# Patient Record
Sex: MALE | Race: AMERICAN INDIAN OR ALASKA NATIVE | NOT HISPANIC OR LATINO
[De-identification: names, ages, dates, MRNs, and addresses within clinical notes are randomized per-mention and may not be internally consistent; named-entity substitution may affect disease eponyms.]

---

## 2017-06-23 ENCOUNTER — TRANSCRIPTION ENCOUNTER (OUTPATIENT)
Age: 42
End: 2017-06-23

## 2017-07-08 ENCOUNTER — TRANSCRIPTION ENCOUNTER (OUTPATIENT)
Age: 42
End: 2017-07-08

## 2017-07-21 ENCOUNTER — OUTPATIENT (OUTPATIENT)
Dept: OUTPATIENT SERVICES | Facility: HOSPITAL | Age: 42
LOS: 1 days | Discharge: HOME | End: 2017-07-21

## 2017-07-21 DIAGNOSIS — Z01.818 ENCOUNTER FOR OTHER PREPROCEDURAL EXAMINATION: ICD-10-CM

## 2017-07-21 DIAGNOSIS — E01.8 OTHER IODINE-DEFICIENCY RELATED THYROID DISORDERS AND ALLIED CONDITIONS: ICD-10-CM

## 2017-07-21 DIAGNOSIS — S83.231D COMPLEX TEAR OF MEDIAL MENISCUS, CURRENT INJURY, RIGHT KNEE, SUBSEQUENT ENCOUNTER: ICD-10-CM

## 2017-08-04 ENCOUNTER — OUTPATIENT (OUTPATIENT)
Dept: OUTPATIENT SERVICES | Facility: HOSPITAL | Age: 42
LOS: 1 days | Discharge: HOME | End: 2017-08-04

## 2017-08-10 DIAGNOSIS — F17.210 NICOTINE DEPENDENCE, CIGARETTES, UNCOMPLICATED: ICD-10-CM

## 2017-08-10 DIAGNOSIS — M94.261 CHONDROMALACIA, RIGHT KNEE: ICD-10-CM

## 2017-08-10 DIAGNOSIS — M23.261 DERANGEMENT OF OTHER LATERAL MENISCUS DUE TO OLD TEAR OR INJURY, RIGHT KNEE: ICD-10-CM

## 2021-09-10 ENCOUNTER — TRANSCRIPTION ENCOUNTER (OUTPATIENT)
Age: 46
End: 2021-09-10

## 2022-06-12 ENCOUNTER — NON-APPOINTMENT (OUTPATIENT)
Age: 47
End: 2022-06-12

## 2022-06-13 ENCOUNTER — INPATIENT (INPATIENT)
Facility: HOSPITAL | Age: 47
LOS: 1 days | Discharge: HOME | End: 2022-06-15
Attending: INTERNAL MEDICINE | Admitting: INTERNAL MEDICINE
Payer: COMMERCIAL

## 2022-06-13 VITALS
HEART RATE: 100 BPM | SYSTOLIC BLOOD PRESSURE: 140 MMHG | TEMPERATURE: 101 F | OXYGEN SATURATION: 93 % | RESPIRATION RATE: 18 BRPM | DIASTOLIC BLOOD PRESSURE: 88 MMHG

## 2022-06-13 LAB
ALBUMIN SERPL ELPH-MCNC: 3.4 G/DL — LOW (ref 3.5–5.2)
ALP SERPL-CCNC: 137 U/L — HIGH (ref 30–115)
ALT FLD-CCNC: 98 U/L — HIGH (ref 0–41)
ANION GAP SERPL CALC-SCNC: 16 MMOL/L — HIGH (ref 7–14)
APPEARANCE UR: CLEAR — SIGNIFICANT CHANGE UP
AST SERPL-CCNC: 73 U/L — HIGH (ref 0–41)
BACTERIA # UR AUTO: NEGATIVE — SIGNIFICANT CHANGE UP
BASE EXCESS BLDV CALC-SCNC: 4.6 MMOL/L — HIGH (ref -2–3)
BASOPHILS # BLD AUTO: 0 K/UL — SIGNIFICANT CHANGE UP (ref 0–0.2)
BASOPHILS NFR BLD AUTO: 0 % — SIGNIFICANT CHANGE UP (ref 0–1)
BILIRUB SERPL-MCNC: 0.8 MG/DL — SIGNIFICANT CHANGE UP (ref 0.2–1.2)
BILIRUB UR-MCNC: NEGATIVE — SIGNIFICANT CHANGE UP
BUN SERPL-MCNC: 15 MG/DL — SIGNIFICANT CHANGE UP (ref 10–20)
CA-I SERPL-SCNC: 1.13 MMOL/L — LOW (ref 1.15–1.33)
CALCIUM SERPL-MCNC: 9 MG/DL — SIGNIFICANT CHANGE UP (ref 8.5–10.1)
CHLORIDE SERPL-SCNC: 96 MMOL/L — LOW (ref 98–110)
CO2 SERPL-SCNC: 21 MMOL/L — SIGNIFICANT CHANGE UP (ref 17–32)
COLOR SPEC: YELLOW — SIGNIFICANT CHANGE UP
CREAT SERPL-MCNC: 0.9 MG/DL — SIGNIFICANT CHANGE UP (ref 0.7–1.5)
DIFF PNL FLD: NEGATIVE — SIGNIFICANT CHANGE UP
EGFR: 106 ML/MIN/1.73M2 — SIGNIFICANT CHANGE UP
EOSINOPHIL # BLD AUTO: 0 K/UL — SIGNIFICANT CHANGE UP (ref 0–0.7)
EOSINOPHIL NFR BLD AUTO: 0 % — SIGNIFICANT CHANGE UP (ref 0–8)
EPI CELLS # UR: 1 /HPF — SIGNIFICANT CHANGE UP (ref 0–5)
FLUAV AG NPH QL: SIGNIFICANT CHANGE UP
FLUBV AG NPH QL: SIGNIFICANT CHANGE UP
GAS PNL BLDV: 130 MMOL/L — LOW (ref 136–145)
GAS PNL BLDV: SIGNIFICANT CHANGE UP
GAS PNL BLDV: SIGNIFICANT CHANGE UP
GIANT PLATELETS BLD QL SMEAR: PRESENT — SIGNIFICANT CHANGE UP
GLUCOSE SERPL-MCNC: 101 MG/DL — HIGH (ref 70–99)
GLUCOSE UR QL: NEGATIVE — SIGNIFICANT CHANGE UP
HCO3 BLDV-SCNC: 27 MMOL/L — SIGNIFICANT CHANGE UP (ref 22–29)
HCT VFR BLD CALC: 37.3 % — LOW (ref 42–52)
HCT VFR BLDA CALC: 58 % — CRITICAL HIGH (ref 39–51)
HGB BLD CALC-MCNC: 19.2 G/DL — CRITICAL HIGH (ref 12.6–17.4)
HGB BLD-MCNC: 13.4 G/DL — LOW (ref 14–18)
HIV 1 & 2 AB SERPL IA.RAPID: SIGNIFICANT CHANGE UP
HYALINE CASTS # UR AUTO: 0 /LPF — SIGNIFICANT CHANGE UP (ref 0–7)
KETONES UR-MCNC: NEGATIVE — SIGNIFICANT CHANGE UP
LACTATE BLDV-MCNC: 1.5 MMOL/L — SIGNIFICANT CHANGE UP (ref 0.5–2)
LEUKOCYTE ESTERASE UR-ACNC: NEGATIVE — SIGNIFICANT CHANGE UP
LYMPHOCYTES # BLD AUTO: 11.4 % — LOW (ref 20.5–51.1)
LYMPHOCYTES # BLD AUTO: 3.26 K/UL — SIGNIFICANT CHANGE UP (ref 1.2–3.4)
MANUAL SMEAR VERIFICATION: SIGNIFICANT CHANGE UP
MCHC RBC-ENTMCNC: 32.8 PG — HIGH (ref 27–31)
MCHC RBC-ENTMCNC: 35.9 G/DL — SIGNIFICANT CHANGE UP (ref 32–37)
MCV RBC AUTO: 91.2 FL — SIGNIFICANT CHANGE UP (ref 80–94)
METAMYELOCYTES # FLD: 1.8 % — HIGH (ref 0–0)
MONOCYTES # BLD AUTO: 2.52 K/UL — HIGH (ref 0.1–0.6)
MONOCYTES NFR BLD AUTO: 8.8 % — SIGNIFICANT CHANGE UP (ref 1.7–9.3)
MYELOCYTES NFR BLD: 1.7 % — HIGH (ref 0–0)
NEUTROPHILS # BLD AUTO: 21.55 K/UL — HIGH (ref 1.4–6.5)
NEUTROPHILS NFR BLD AUTO: 67.5 % — SIGNIFICANT CHANGE UP (ref 42.2–75.2)
NEUTS BAND # BLD: 7.9 % — HIGH (ref 0–6)
NITRITE UR-MCNC: NEGATIVE — SIGNIFICANT CHANGE UP
PCO2 BLDV: 33 MMHG — LOW (ref 42–55)
PH BLDV: 7.52 — HIGH (ref 7.32–7.43)
PH UR: 6.5 — SIGNIFICANT CHANGE UP (ref 5–8)
PLAT MORPH BLD: NORMAL — SIGNIFICANT CHANGE UP
PLATELET # BLD AUTO: 310 K/UL — SIGNIFICANT CHANGE UP (ref 130–400)
PO2 BLDV: 53 MMHG — SIGNIFICANT CHANGE UP
POTASSIUM BLDV-SCNC: 4.5 MMOL/L — SIGNIFICANT CHANGE UP (ref 3.5–5.1)
POTASSIUM SERPL-MCNC: 4.4 MMOL/L — SIGNIFICANT CHANGE UP (ref 3.5–5)
POTASSIUM SERPL-SCNC: 4.4 MMOL/L — SIGNIFICANT CHANGE UP (ref 3.5–5)
PROT SERPL-MCNC: 7.3 G/DL — SIGNIFICANT CHANGE UP (ref 6–8)
PROT UR-MCNC: ABNORMAL
RBC # BLD: 4.09 M/UL — LOW (ref 4.7–6.1)
RBC # FLD: 13.3 % — SIGNIFICANT CHANGE UP (ref 11.5–14.5)
RBC BLD AUTO: ABNORMAL
RBC CASTS # UR COMP ASSIST: 2 /HPF — SIGNIFICANT CHANGE UP (ref 0–4)
RSV RNA NPH QL NAA+NON-PROBE: SIGNIFICANT CHANGE UP
SAO2 % BLDV: 88.1 % — SIGNIFICANT CHANGE UP
SARS-COV-2 RNA SPEC QL NAA+PROBE: SIGNIFICANT CHANGE UP
SODIUM SERPL-SCNC: 133 MMOL/L — LOW (ref 135–146)
SP GR SPEC: 1.02 — SIGNIFICANT CHANGE UP (ref 1.01–1.03)
TOXIC GRANULES BLD QL SMEAR: PRESENT — SIGNIFICANT CHANGE UP
TSH SERPL-MCNC: 0.5 UIU/ML — SIGNIFICANT CHANGE UP (ref 0.27–4.2)
UROBILINOGEN FLD QL: ABNORMAL
VARIANT LYMPHS # BLD: 0.9 % — SIGNIFICANT CHANGE UP (ref 0–5)
WBC # BLD: 28.58 K/UL — HIGH (ref 4.8–10.8)
WBC # FLD AUTO: 28.58 K/UL — HIGH (ref 4.8–10.8)
WBC UR QL: 2 /HPF — SIGNIFICANT CHANGE UP (ref 0–5)

## 2022-06-13 PROCEDURE — 71046 X-RAY EXAM CHEST 2 VIEWS: CPT | Mod: 26

## 2022-06-13 PROCEDURE — 93010 ELECTROCARDIOGRAM REPORT: CPT

## 2022-06-13 PROCEDURE — 99285 EMERGENCY DEPT VISIT HI MDM: CPT

## 2022-06-13 PROCEDURE — 76705 ECHO EXAM OF ABDOMEN: CPT | Mod: 26

## 2022-06-13 PROCEDURE — 99223 1ST HOSP IP/OBS HIGH 75: CPT

## 2022-06-13 RX ORDER — SODIUM CHLORIDE 9 MG/ML
2400 INJECTION INTRAMUSCULAR; INTRAVENOUS; SUBCUTANEOUS ONCE
Refills: 0 | Status: COMPLETED | OUTPATIENT
Start: 2022-06-13 | End: 2022-06-13

## 2022-06-13 RX ORDER — CEFTRIAXONE 500 MG/1
1000 INJECTION, POWDER, FOR SOLUTION INTRAMUSCULAR; INTRAVENOUS EVERY 24 HOURS
Refills: 0 | Status: DISCONTINUED | OUTPATIENT
Start: 2022-06-13 | End: 2022-06-15

## 2022-06-13 RX ORDER — DESMOPRESSIN ACETATE 0.1 MG/1
2 TABLET ORAL THREE TIMES A DAY
Refills: 0 | Status: DISCONTINUED | OUTPATIENT
Start: 2022-06-13 | End: 2022-06-15

## 2022-06-13 RX ORDER — CEFTRIAXONE 500 MG/1
1000 INJECTION, POWDER, FOR SOLUTION INTRAMUSCULAR; INTRAVENOUS ONCE
Refills: 0 | Status: COMPLETED | OUTPATIENT
Start: 2022-06-13 | End: 2022-06-13

## 2022-06-13 RX ORDER — IPRATROPIUM/ALBUTEROL SULFATE 18-103MCG
3 AEROSOL WITH ADAPTER (GRAM) INHALATION
Refills: 0 | Status: COMPLETED | OUTPATIENT
Start: 2022-06-13 | End: 2022-06-13

## 2022-06-13 RX ORDER — AZITHROMYCIN 500 MG/1
500 TABLET, FILM COATED ORAL ONCE
Refills: 0 | Status: COMPLETED | OUTPATIENT
Start: 2022-06-13 | End: 2022-06-13

## 2022-06-13 RX ORDER — SODIUM CHLORIDE 9 MG/ML
1000 INJECTION INTRAMUSCULAR; INTRAVENOUS; SUBCUTANEOUS
Refills: 0 | Status: DISCONTINUED | OUTPATIENT
Start: 2022-06-13 | End: 2022-06-14

## 2022-06-13 RX ORDER — AZITHROMYCIN 500 MG/1
500 TABLET, FILM COATED ORAL EVERY 24 HOURS
Refills: 0 | Status: DISCONTINUED | OUTPATIENT
Start: 2022-06-14 | End: 2022-06-15

## 2022-06-13 RX ORDER — IPRATROPIUM/ALBUTEROL SULFATE 18-103MCG
3 AEROSOL WITH ADAPTER (GRAM) INHALATION ONCE
Refills: 0 | Status: COMPLETED | OUTPATIENT
Start: 2022-06-13 | End: 2022-06-13

## 2022-06-13 RX ORDER — HEPARIN SODIUM 5000 [USP'U]/ML
5000 INJECTION INTRAVENOUS; SUBCUTANEOUS EVERY 8 HOURS
Refills: 0 | Status: DISCONTINUED | OUTPATIENT
Start: 2022-06-13 | End: 2022-06-15

## 2022-06-13 RX ORDER — ACETAMINOPHEN 500 MG
650 TABLET ORAL ONCE
Refills: 0 | Status: COMPLETED | OUTPATIENT
Start: 2022-06-13 | End: 2022-06-13

## 2022-06-13 RX ORDER — LANOLIN ALCOHOL/MO/W.PET/CERES
10 CREAM (GRAM) TOPICAL ONCE
Refills: 0 | Status: COMPLETED | OUTPATIENT
Start: 2022-06-13 | End: 2022-06-13

## 2022-06-13 RX ORDER — LEVOTHYROXINE SODIUM 125 MCG
50 TABLET ORAL DAILY
Refills: 0 | Status: DISCONTINUED | OUTPATIENT
Start: 2022-06-13 | End: 2022-06-15

## 2022-06-13 RX ORDER — HYDROCORTISONE 20 MG
100 TABLET ORAL ONCE
Refills: 0 | Status: COMPLETED | OUTPATIENT
Start: 2022-06-13 | End: 2022-06-13

## 2022-06-13 RX ADMIN — CEFTRIAXONE 100 MILLIGRAM(S): 500 INJECTION, POWDER, FOR SOLUTION INTRAMUSCULAR; INTRAVENOUS at 11:11

## 2022-06-13 RX ADMIN — CEFTRIAXONE 1000 MILLIGRAM(S): 500 INJECTION, POWDER, FOR SOLUTION INTRAMUSCULAR; INTRAVENOUS at 11:48

## 2022-06-13 RX ADMIN — Medication 3 MILLILITER(S): at 11:20

## 2022-06-13 RX ADMIN — Medication 100 MILLIGRAM(S): at 12:42

## 2022-06-13 RX ADMIN — Medication 3 MILLILITER(S): at 12:42

## 2022-06-13 RX ADMIN — Medication 3 MILLILITER(S): at 21:28

## 2022-06-13 RX ADMIN — Medication 10 MILLIGRAM(S): at 21:48

## 2022-06-13 RX ADMIN — AZITHROMYCIN 255 MILLIGRAM(S): 500 TABLET, FILM COATED ORAL at 11:11

## 2022-06-13 RX ADMIN — SODIUM CHLORIDE 2400 MILLILITER(S): 9 INJECTION INTRAMUSCULAR; INTRAVENOUS; SUBCUTANEOUS at 11:19

## 2022-06-13 RX ADMIN — Medication 650 MILLIGRAM(S): at 11:48

## 2022-06-13 RX ADMIN — SODIUM CHLORIDE 60 MILLILITER(S): 9 INJECTION INTRAMUSCULAR; INTRAVENOUS; SUBCUTANEOUS at 17:49

## 2022-06-13 RX ADMIN — Medication 3 MILLILITER(S): at 11:15

## 2022-06-13 RX ADMIN — Medication 650 MILLIGRAM(S): at 11:11

## 2022-06-13 NOTE — H&P ADULT - HISTORY OF PRESENT ILLNESS
47-year-old male presents to the ED with multiple medical complaints. Pt complains of generalized fatigue, cough with productive sputum and difficulty breathing.  Patient states that he is concerned that he may be having an adrenal crisis given his fatigue.  Also reports fever for the past 3 days T-max 101, has not taken any medication prior to coming into the ER.  Patient also states that he has not taken his thyroid medication, Synthroid 50 mcg, in the past 6 months stating that he was trying to wean himself off of it.  Patient denies headache vision change neck pain chest pain abdominal pain back pain dizziness lightheadedness syncope weakness or numbness.  No recent sick contacts, vaccinated for COVID    Labs :WBC 28, Lactate 1.5, Alk phos 137, AST 73, ALT 98    CXR : Patchy left upper and lower lung zone airspace opacities, compatible with pneumonia in the appropriate clinical setting. Follow-up to resolution is recommended.    s/p NS, rocephin, azithromycin and hydrocortisone 100mg IVP in the ED 47-year-old male PMH Hypopituitarism (IGG4 related per wife) diagnosed 6 years ago, sec adrenal insufficiency (Multiple episodes of adrenal crisis), hypothyroidism,  presents to the ED with multiple medical complaints. Pt complains of generalized fatigue, cough with productive sputum and difficulty breathing.  Patient states that he is concerned that he may be having an adrenal crisis given his symptoms. Also reports fever for the past 3 days T-max 101, has not taken any medication prior to coming into the ER.    Endorses non -compliance with Synthroid 50 mcg, in the past 6 months, but resumed last thursday when he was not feeling well.  Patient denies headache, vision change, neck pain, chest pain, abdominal pain, back pain, dizziness, lightheadedness, syncope, weakness or numbness.  No recent sick contacts, vaccinated for COVID    Labs :WBC 28, Lactate 1.5, Alk phos 137, AST 73, ALT 98    CXR : Patchy left upper and lower lung zone airspace opacities, compatible with pneumonia in the appropriate clinical setting. Follow-up to resolution is recommended.    s/p NS, rocephin, azithromycin and hydrocortisone 100mg IVP in the ED

## 2022-06-13 NOTE — PATIENT PROFILE ADULT - FALL HARM RISK - HARM RISK INTERVENTIONS

## 2022-06-13 NOTE — ED PROVIDER NOTE - OBJECTIVE STATEMENT
47-year-old male presents emergency department complaining of generalized fatigue, cough with productive sputum and difficulty breathing.  Patient states that he is concerned that he may be having an adrenal crisis given his fatigue.  Also reports fever for the past 3 days T-max 101, has not taken any medication prior to coming into the ER.  Patient also states that he has not taken his thyroid medication, Synthroid 50 mcg, in the past 6 months stating that he was trying to wean himself off of it.  Patient denies headache vision change neck pain chest pain abdominal pain back pain dizziness lightheadedness syncope weakness or numbness.  No recent sick contacts vaccinated for COVID

## 2022-06-13 NOTE — H&P ADULT - NSHPPHYSICALEXAM_GEN_ALL_CORE
GENERAL: NAD, speaks in full sentences, no signs of respiratory distress  HEAD:  Atraumatic, Normocephalic  EYES: EOMI, PERRLA, conjunctiva and sclera clear  NECK: Supple, No JVD  CHEST/LUNG: Clear to auscultation bilaterally; No wheeze; No crackles; No accessory muscles used  HEART: Regular rate and rhythm; No murmurs;   ABDOMEN: Soft, Nontender, Nondistended; Bowel sounds present; No guarding  EXTREMITIES:  2+ Peripheral Pulses, No cyanosis or edema  PSYCH: AAOx3  NEUROLOGY: non-focal GENERAL: NAD, speaks in full sentences, no signs of respiratory distress  HEAD:  Atraumatic, Normocephalic  NECK: Supple, No JVD  CHEST/LUNG: crackles L >R, No accessory muscles used  HEART: Regular rate and rhythm; No murmurs;   ABDOMEN: Soft, Nontender, Nondistended; Bowel sounds present; No guarding  EXTREMITIES:  2+ Peripheral Pulses, No cyanosis or edema  PSYCH: AAOx3  NEUROLOGY: non-focal

## 2022-06-13 NOTE — H&P ADULT - NSICDXPASTMEDICALHX_GEN_ALL_CORE_FT
PAST MEDICAL HISTORY:  Hypothyroidism      PAST MEDICAL HISTORY:  Adrenal insufficiency     Hypopituitarism     Hypothyroidism

## 2022-06-13 NOTE — ED ADULT NURSE NOTE - NSIMPLEMENTINTERV_GEN_ALL_ED
Implemented All Universal Safety Interventions:  [place] to call system. Call bell, personal items and telephone within reach. Instruct patient to call for assistance. Room bathroom lighting operational. Non-slip footwear when patient is off stretcher. Physically safe environment: no spills, clutter or unnecessary equipment. Stretcher in lowest position, wheels locked, appropriate side rails in place.

## 2022-06-13 NOTE — ED ADULT NURSE NOTE - CHIEF COMPLAINT QUOTE
sent in from [facility] [place] [place] "fluid in lungs and adrenal crisis" pt hasn't been taking thyroid medications for over 6 months

## 2022-06-13 NOTE — ED PROVIDER NOTE - PHYSICAL EXAMINATION
CONST: Well appearing in NAD  EYES: PERRL, EOMI, Sclera and conjunctiva clear. Vision grossly intact  ENT: No nasal discharge. TM's clear B/L without drainage. Oropharynx normal appearing, no erythema or exudates. Uvula midline.  NECK: Non-tender, no meningeal signs  CARD: Normal S1 S2; Normal rate and rhythm  RESP: Rhonchi over right middle and lower lung fields, patient able to speak in full sentences no signs of respiratory distress  GI: Soft, non-tender, non-distended. No rebound or Guarding  MS: Normal ROM in all extremities. No midline spinal tenderness.  SKIN: Warm, dry, no acute rashes. Good turgor  NEURO: A&Ox3, No focal deficits. Strength 5/5 with no sensory deficits. Steady gait

## 2022-06-13 NOTE — ED PROVIDER NOTE - NS ED ROS FT
CONST: Fever with body aches and generalized fatigue  EYES: No pain, redness, drainage or visual changes.  ENT: No ear pain or discharge, nasal discharge or congestion. No sore throat  CARD: No chest pain, palpitations  RESP: Shortness of breath with productive cough, whitish sputum  GI: No abdominal pain, N/V/D  : No urinary symptoms  MS: No joint pain, back pain or extremity pain/injury  SKIN: No rashes  NEURO: No headache, dizziness, paresthesias or LOC

## 2022-06-13 NOTE — ED PROVIDER NOTE - ATTENDING APP SHARED VISIT CONTRIBUTION OF CARE
47-year-old male history of hypothyroidism but not compliant with his Synthroid, adrenal insufficiency, now presents with fever x3 days, with productive cough.  Seen in urgent care.  Referred to evaluation.  Vaccinations up-to-date.  No vomiting.  No abdominal pain.    vss, febrile, full sentences, nontoxic, well appearing, pink conj, no photophobia, anicteric, MMM, neck supple, bilateral rhonchi, wheezing, RRR, equal radial pulses bilat, abd soft/nt/nd, no cva tend. no calves tend, no edema, no fnd. no rashes.    Plan:  labs, imaging, meds, reassess

## 2022-06-13 NOTE — ED PROVIDER NOTE - NS ED ATTENDING STATEMENT MOD
This was a shared visit with the TING. I reviewed and verified the documentation and independently performed the documented:

## 2022-06-13 NOTE — H&P ADULT - ATTENDING COMMENTS
The patient is seen and examined the history labs and imaging are reviewed. I agree with the resident note unless otherwise noted.    #Community Acquired Pneumonia  # Diffuse wheezing in setting of chronic smoking history.  # Sepsis present on admission   CXR : Patchy left upper and lower lung zone airspace opacities, compatible with pneumonia in the appropriate clinical setting. Follow-up to resolution is recommended.  s/p NS, Rocephin, azithromycin and hydrocortisone 100mg IVP in the ED  f/u BCx    urine legionella   outpatient pulmonary eval   Dunebs q6 PRN     #Hx of hypopituitarism (IGG4 related) and subsequent hormone insufficiency   On synthroid 50mcg qD  Prednisone 10 qD- Endorses compliance, S/P 100mg IVP hydrocortisone in the, can c/w hydrocortisone given febrile illness and possible COPD   Testosterone gel qD  Desmopressin 0.01 nasal spray TID PRN   Electrolytes wnl- Monitor qD    - rest as above     PHYSICAL EXAM:  GENERAL: 2L NC  HEAD:  Atraumatic, Normocephalic  EYES: conjunctiva and sclera clear  ENT: Moist mucous membranes  NECK: , No JVD  CHEST/LUNG: diffuse wheeze   HEART: Regular rate and rhythm;  ABDOMEN:  Soft, Nontender, Nondistended  EXTREMITIES:   No clubbing, cyanosis, or edema  NERVOUS SYSTEM:  Alert & Oriented X3, speech clear  MSK: FROM all 4 extremities, full and equal strength  SKIN: No rashes or lesions The patient is seen and examined the history labs and imaging are reviewed. I agree with the resident note unless otherwise noted.    #Community Acquired Pneumonia  # Diffuse wheezing in setting of chronic smoking history. Possible COPD  # Sepsis present on admission   CXR : Patchy left upper and lower lung zone airspace opacities, compatible with pneumonia in the appropriate clinical setting. Follow-up to resolution is recommended.  s/p NS, Rocephin, azithromycin and hydrocortisone 100mg IVP in the ED  f/u BCx    urine legionella   outpatient pulmonary eval   Dunebs q6 PRN     #Hx of hypopituitarism (IGG4 related) and subsequent hormone insufficiency   On synthroid 50mcg qD  Prednisone 10 QD- Endorses compliance, S/P 100mg IVP hydrocortisone in the ED, will increase daily dose to 30mg for now - due to patient's illness and possible COPD  Testosterone gel qD  Desmopressin 0.01 nasal spray TID PRN   Electrolytes wnl- Monitor qD  f/u AM cortisol  f/u TSH       - rest as above     PHYSICAL EXAM:  GENERAL: 2L NC  HEAD:  Atraumatic, Normocephalic  EYES: conjunctiva and sclera clear  ENT: Moist mucous membranes  NECK: , No JVD  CHEST/LUNG: diffuse wheeze   HEART: Regular rate and rhythm;  ABDOMEN:  Soft, Nontender, Nondistended  EXTREMITIES:   No clubbing, cyanosis, or edema  NERVOUS SYSTEM:  Alert & Oriented X3, speech clear  MSK: FROM all 4 extremities, full and equal strength  SKIN: No rashes or lesions The patient is seen and examined the history labs and imaging are reviewed. I agree with the resident note unless otherwise noted.    #Community Acquired Pneumonia  # Diffuse wheezing in setting of chronic smoking history. Possible COPD  # Sepsis present on admission   CXR : Patchy left upper and lower lung zone airspace opacities, compatible with pneumonia in the appropriate clinical setting. Follow-up to resolution is recommended.   s/p NS, Rocephin, azithromycin and hydrocortisone 100mg IVP in the ED  f/u BCx    urine legionella   outpatient pulmonary eval and ct chest  Dunebs q6 PRN     #Hx of hypopituitarism (IGG4 related) and subsequent hormone insufficiency   On synthroid 50mcg qD  Prednisone 10 QD- Endorses compliance, S/P 100mg IVP hydrocortisone in the ED, will increase daily dose to 30mg for now - due to patient's illness and possible COPD, tolerates PO   Testosterone gel qD  Desmopressin 0.01 nasal spray TID PRN   Electrolytes wnl- Monitor qD  f/u AM cortisol  f/u TSH       - rest as above     PHYSICAL EXAM:  GENERAL: 2L NC  HEAD:  Atraumatic, Normocephalic  EYES: conjunctiva and sclera clear  ENT: Moist mucous membranes  NECK: , No JVD  CHEST/LUNG: diffuse wheeze   HEART: Regular rate and rhythm;  ABDOMEN:  Soft, Nontender, Nondistended  EXTREMITIES:   No clubbing, cyanosis, or edema  NERVOUS SYSTEM:  Alert & Oriented X3, speech clear  MSK: FROM all 4 extremities, full and equal strength  SKIN: No rashes or lesions

## 2022-06-13 NOTE — H&P ADULT - NSHPLABSRESULTS_GEN_ALL_CORE
13.4   28.58 )-----------( 310      ( 13 Jun 2022 10:37 )             37.3       06-13    133<L>  |  96<L>  |  15  ----------------------------<  101<H>  4.4   |  21  |  0.9    Ca    9.0      13 Jun 2022 10:37    TPro  7.3  /  Alb  3.4<L>  /  TBili  0.8  /  DBili  x   /  AST  73<H>  /  ALT  98<H>  /  AlkPhos  137<H>  06-13                      Lactate Trend            CAPILLARY BLOOD GLUCOSE

## 2022-06-13 NOTE — ED ADULT NURSE NOTE - OBJECTIVE STATEMENT
Pt sent in from urgent care for adrenal crisis and fluid in lungs. Pt states she has not taken his medication for 6 months because he felt better. Pt A&O x 4, ambulatory with steady gait. Cardiac monitor applied. VSS at this time.

## 2022-06-13 NOTE — H&P ADULT - ASSESSMENT
47-year-old male presents to the ED with multiple medical complaints. Pt complains of generalized fatigue, cough with productive sputum and difficulty breathing.  Patient states that he is concerned that he may be having an adrenal crisis given his fatigue.  Also reports fever for the past 3 days T-max 101, has not taken any medication prior to coming into the ER.  Patient also states that he has not taken his thyroid medication, Synthroid 50 mcg, in the past 6 months stating that he was trying to wean himself off of it.  Patient denies headache vision change neck pain chest pain abdominal pain back pain dizziness lightheadedness syncope weakness or numbness.  No recent sick contacts, vaccinated for COVID    Labs :WBC 28, Lactate 1.5, Alk phos 137, AST 73, ALT 98      #Community Acquired Pneumonia  Sepsis present on admission  Saturating 96% on 2L, titrate PRN    CXR : Patchy left upper and lower lung zone airspace opacities, compatible with pneumonia in the appropriate clinical setting. Follow-up to resolution is recommended.  s/p NS, rocephin, azithromycin and hydrocortisone 100mg IVP in the ED  f/u UA and BCx    f/u MRSA PCR     #Hypothyroidism  Non compliant with Synthroid  F/U TSH    #Mild Transaminitis   f/u RUQ U/S  avoid hepatotoxic meds   Trend LFTs     ?Adrenal crisis      #DVT PPX :   #Diet:  #GI PPX:   #Activity:  CODE  47-year-old male PMH Hypopituitarism (IGG4 related per wife) diagnosed 6 years ago, sec adrenal insufficiency (Multiple episodes of adrenal crisis), hypothyroidism,  presents to the ED with multiple medical complaints. Pt complains of generalized fatigue, cough with productive sputum and difficulty breathing.  Patient states that he is concerned that he may be having an adrenal crisis given his symptoms. Also reports fever for the past 3 days T-max 101, has not taken any medication prior to coming into the ER.    Endorses non -compliance with Synthroid 50 mcg, in the past 6 months, but resumed last thursday when he was not feeling well.  Patient denies headache, vision change, neck pain, chest pain, abdominal pain, back pain, dizziness, lightheadedness, syncope, weakness or numbness.  No recent sick contacts, vaccinated for COVID    Labs :WBC 28, Lactate 1.5, Alk phos 137, AST 73, ALT 98      #Community Acquired Pneumonia  Sepsis present on admission  Saturating 96% on 2L, titrate PRN    CXR : Patchy left upper and lower lung zone airspace opacities, compatible with pneumonia in the appropriate clinical setting. Follow-up to resolution is recommended.  s/p NS, rocephin, azithromycin and hydrocortisone 100mg IVP in the ED  f/u UA and BCx      #Hx of hypopituitarism (IGG4 related) and subsequent hormone insufficiency   On synthroid 50mcg qD  Prednisone 10 qD- Endorses compliance, S/P 100mg IVP hydrocortisone in the ED, will increase daily dose to 30mg for now - due to patient's illness  Testosterone gel qD  Desmopressin 0.01 nasal spray TID PRN   Electrolytes wnl- Monitor qD  f/u AM cortisol  f/u TSH         #Mild Transaminitis   f/u RUQ U/S  avoid hepatotoxic meds   Trend LFTs       #Benign lung lesions  cont f/u at [facility]    #DVT PPX : Heparin subq  #Diet: Regular  #GI PPX: Not indicated   #Activity: IAT   47-year-old male PMH Hypopituitarism (IGG4 related per wife) diagnosed 6 years ago, sec adrenal insufficiency (Multiple episodes of adrenal crisis), hypothyroidism,  presents to the ED with multiple medical complaints. Pt complains of generalized fatigue, cough with productive sputum and difficulty breathing.  Patient states that he is concerned that he may be having an adrenal crisis given his symptoms. Also reports fever for the past 3 days T-max 101, has not taken any medication prior to coming into the ER.    Endorses non -compliance with Synthroid 50 mcg, in the past 6 months, but resumed last thursday when he was not feeling well.  Patient denies headache, vision change, neck pain, chest pain, abdominal pain, back pain, dizziness, lightheadedness, syncope, weakness or numbness.  No recent sick contacts, vaccinated for COVID    Labs :WBC 28, Lactate 1.5, Alk phos 137, AST 73, ALT 98      #Community Acquired Pneumonia  Sepsis present on admission  Saturating 96% on 2L, titrate PRN    CXR : Patchy left upper and lower lung zone airspace opacities, compatible with pneumonia in the appropriate clinical setting. Follow-up to resolution is recommended.  s/p NS, rocephin, azithromycin and hydrocortisone 100mg IVP in the ED  f/u UA and BCx      #Hx of hypopituitarism (IGG4 related) and subsequent hormone insufficiency   On synthroid 50mcg qD  Prednisone 10 qD- Endorses compliance, S/P 100mg IVP hydrocortisone in the ED, will increase daily dose to 30mg for now - due to patient's illness  Testosterone gel qD  Desmopressin 0.01 nasal spray TID PRN   Electrolytes wnl- Monitor qD  f/u AM cortisol  f/u TSH       #Mild Transaminitis   f/u RUQ U/S  ggt  avoid hepatotoxic meds   Trend LFTs       #Benign lung lesions  cont f/u at [facility]    #DVT PPX : Heparin subq  #Diet: Regular  #GI PPX: Not indicated   #Activity: IAT

## 2022-06-13 NOTE — ED ADULT TRIAGE NOTE - CHIEF COMPLAINT QUOTE
sent in from [facility] for "fluid in lungs and adrenal crisis" pt hasn't been taking thyroid medications for over 6 months

## 2022-06-14 LAB
ALBUMIN SERPL ELPH-MCNC: 2.6 G/DL — LOW (ref 3.5–5.2)
ALDOST SERPL-MCNC: <3 NG/DL — SIGNIFICANT CHANGE UP
ALP SERPL-CCNC: 101 U/L — SIGNIFICANT CHANGE UP (ref 30–115)
ALT FLD-CCNC: 94 U/L — HIGH (ref 0–41)
ANION GAP SERPL CALC-SCNC: 13 MMOL/L — SIGNIFICANT CHANGE UP (ref 7–14)
AST SERPL-CCNC: 86 U/L — HIGH (ref 0–41)
BILIRUB SERPL-MCNC: 0.4 MG/DL — SIGNIFICANT CHANGE UP (ref 0.2–1.2)
BUN SERPL-MCNC: 16 MG/DL — SIGNIFICANT CHANGE UP (ref 10–20)
CALCIUM SERPL-MCNC: 8.2 MG/DL — LOW (ref 8.5–10.1)
CHLORIDE SERPL-SCNC: 103 MMOL/L — SIGNIFICANT CHANGE UP (ref 98–110)
CO2 SERPL-SCNC: 25 MMOL/L — SIGNIFICANT CHANGE UP (ref 17–32)
CREAT SERPL-MCNC: 0.8 MG/DL — SIGNIFICANT CHANGE UP (ref 0.7–1.5)
EGFR: 110 ML/MIN/1.73M2 — SIGNIFICANT CHANGE UP
GLUCOSE SERPL-MCNC: 91 MG/DL — SIGNIFICANT CHANGE UP (ref 70–99)
HCT VFR BLD CALC: 33.2 % — LOW (ref 42–52)
HGB BLD-MCNC: 11.6 G/DL — LOW (ref 14–18)
MAGNESIUM SERPL-MCNC: 2.1 MG/DL — SIGNIFICANT CHANGE UP (ref 1.8–2.4)
MCHC RBC-ENTMCNC: 31.8 PG — HIGH (ref 27–31)
MCHC RBC-ENTMCNC: 34.9 G/DL — SIGNIFICANT CHANGE UP (ref 32–37)
MCV RBC AUTO: 91 FL — SIGNIFICANT CHANGE UP (ref 80–94)
NRBC # BLD: 0 /100 WBCS — SIGNIFICANT CHANGE UP (ref 0–0)
PLATELET # BLD AUTO: 293 K/UL — SIGNIFICANT CHANGE UP (ref 130–400)
POTASSIUM SERPL-MCNC: 3.5 MMOL/L — SIGNIFICANT CHANGE UP (ref 3.5–5)
POTASSIUM SERPL-SCNC: 3.5 MMOL/L — SIGNIFICANT CHANGE UP (ref 3.5–5)
PROT SERPL-MCNC: 5.9 G/DL — LOW (ref 6–8)
RBC # BLD: 3.65 M/UL — LOW (ref 4.7–6.1)
RBC # FLD: 13.2 % — SIGNIFICANT CHANGE UP (ref 11.5–14.5)
SODIUM SERPL-SCNC: 141 MMOL/L — SIGNIFICANT CHANGE UP (ref 135–146)
WBC # BLD: 26.09 K/UL — HIGH (ref 4.8–10.8)
WBC # FLD AUTO: 26.09 K/UL — HIGH (ref 4.8–10.8)

## 2022-06-14 PROCEDURE — 99233 SBSQ HOSP IP/OBS HIGH 50: CPT

## 2022-06-14 RX ORDER — IBUPROFEN 200 MG
400 TABLET ORAL ONCE
Refills: 0 | Status: COMPLETED | OUTPATIENT
Start: 2022-06-14 | End: 2022-06-14

## 2022-06-14 RX ORDER — IPRATROPIUM/ALBUTEROL SULFATE 18-103MCG
3 AEROSOL WITH ADAPTER (GRAM) INHALATION EVERY 6 HOURS
Refills: 0 | Status: DISCONTINUED | OUTPATIENT
Start: 2022-06-14 | End: 2022-06-15

## 2022-06-14 RX ADMIN — Medication 3 MILLILITER(S): at 19:44

## 2022-06-14 RX ADMIN — Medication 400 MILLIGRAM(S): at 20:50

## 2022-06-14 RX ADMIN — Medication 30 MILLIGRAM(S): at 05:42

## 2022-06-14 RX ADMIN — AZITHROMYCIN 255 MILLIGRAM(S): 500 TABLET, FILM COATED ORAL at 00:34

## 2022-06-14 RX ADMIN — Medication 50 MICROGRAM(S): at 05:42

## 2022-06-14 RX ADMIN — Medication 400 MILLIGRAM(S): at 06:15

## 2022-06-14 RX ADMIN — Medication 20 MILLIGRAM(S): at 13:26

## 2022-06-14 RX ADMIN — CEFTRIAXONE 100 MILLIGRAM(S): 500 INJECTION, POWDER, FOR SOLUTION INTRAMUSCULAR; INTRAVENOUS at 11:33

## 2022-06-14 NOTE — PROGRESS NOTE ADULT - SUBJECTIVE AND OBJECTIVE BOX
GEOVANNI COUGHLIN 47y Male  MRN#: [id number]      Pt is currently admitted with the primary diagnosis of CAP PNA       Hospital Day: 1d  CC: Fatigue     HPI:  47-year-old male PMH Hypopituitarism (IGG4 related per wife) diagnosed 6 years ago, sec adrenal insufficiency (Multiple episodes of adrenal crisis), hypothyroidism,  presents to the ED with multiple medical complaints. Pt complains of generalized fatigue, cough with productive sputum and difficulty breathing.  Patient states that he is concerned that he may be having an adrenal crisis given his symptoms. Also reports fever for the past 3 days T-max 101, has not taken any medication prior to coming into the ER.    Endorses non -compliance with Synthroid 50 mcg, in the past 6 months, but resumed last thursday when he was not feeling well.  Patient denies headache, vision change, neck pain, chest pain, abdominal pain, back pain, dizziness, lightheadedness, syncope, weakness or numbness.  No recent sick contacts, vaccinated for COVID    Labs :WBC 28, Lactate 1.5, Alk phos 137, AST 73, ALT 98    CXR : Patchy left upper and lower lung zone airspace opacities, compatible with pneumonia in the appropriate clinical setting. Follow-up to resolution is recommended.    s/p NS, rocephin, azithromycin and hydrocortisone 100mg IVP in the ED    Overnight events:  HD stable  Afebrile     Subjective complaints:  Patient complaining of some productive cough, wheezing.     Present Today:   - Christian:  No [ x ], Yes [   ] : Indication:                                             ----------------------------------------------------------    PAST MEDICAL & SURGICAL HISTORY  Hypothyroidism    Adrenal insufficiency    Hypopituitarism                                              -----------------------------------------------------------  ALLERGIES:  No Known Allergies                                            ------------------------------------------------------------    HOME MEDICATIONS  Home Medications:  desmopressin nasal 0.01% spray (obsolete): nasal 3 times a day, As Needed (2022 14:40)  predniSONE 10 mg oral tablet: 1 tab(s) orally once a day (2022 14:41)  Synthroid 50 mcg (0.05 mg) oral tablet: 1 tab(s) orally once a day (2022 14:41)  testosterone topical: Apply topically to affected area once a day (2022 14:39)                           MEDICATIONS:  STANDING MEDICATIONS  azithromycin  IVPB 500 milliGRAM(s) IV Intermittent every 24 hours  cefTRIAXone   IVPB 1000 milliGRAM(s) IV Intermittent every 24 hours  heparin   Injectable 5000 Unit(s) SubCutaneous every 8 hours  levothyroxine 50 MICROGram(s) Oral daily  predniSONE   Tablet 20 milliGRAM(s) Oral once    PRN MEDICATIONS  desmopressin 0.01% Nasal 2 Spray(s) Nasal three times a day PRN                                            ------------------------------------------------------------  VITAL SIGNS: Last 24 Hours  T(C): 36.8 (2022 12:52), Max: 37.2 (2022 15:00)  T(F): 98.3 (2022 12:52), Max: 99 (2022 15:00)  HR: 73 (2022 12:52) (68 - 77)  BP: 165/88 (2022 12:52) (127/76 - 165/88)  BP(mean): 112 (2022 15:00) (112 - 112)  RR: 18 (2022 12:52) (18 - 20)  SpO2: 96% (2022 22:45) (96% - 96%)                                             --------------------------------------------------------------  LABS:                        11.6   26.09 )-----------( 293      ( 2022 06:32 )             33.2     -14    141  |  103  |  16  ----------------------------<  91  3.5   |  25  |  0.8    Ca    8.2<L>      2022 06:32  Mg     2.1         TPro  5.9<L>  /  Alb  2.6<L>  /  TBili  0.4  /  DBili  x   /  AST  86<H>  /  ALT  94<H>  /  AlkPhos  101        Urinalysis Basic - ( 2022 18:00 )    Color: Yellow / Appearance: Clear / S.025 / pH: x  Gluc: x / Ketone: Negative  / Bili: Negative / Urobili: 3 mg/dL   Blood: x / Protein: 30 mg/dL / Nitrite: Negative   Leuk Esterase: Negative / RBC: 2 /HPF / WBC 2 /HPF   Sq Epi: x / Non Sq Epi: 1 /HPF / Bacteria: Negative                                                            -------------------------------------------------------------  RADIOLOGY:                                            --------------------------------------------------------------    PHYSICAL EXAM:  General: NAD   HEENT: Normocephalic, nontraumatic.   LUNGS: Mild inspiratory crackles, expiratory wheeze noted   HEART: RRR. No murmurs, rubs, or gallops.  ABDOMEN: Nontender, nondistended. + bowel sounds.  EXT: Nonedematous

## 2022-06-14 NOTE — PROGRESS NOTE ADULT - ASSESSMENT
47-year-old male PMH Hypopituitarism (IGG4 related per wife) diagnosed 6 years ago, sec adrenal insufficiency (Multiple episodes of adrenal crisis), hypothyroidism,  presents to the ED with multiple medical complaints. Pt complains of generalized fatigue, cough with productive sputum and difficulty breathing.  Patient states that he is concerned that he may be having an adrenal crisis given his symptoms. Also reports fever for the past 3 days T-max 101, has not taken any medication prior to coming into the ER.    Endorses non -compliance with Synthroid 50 mcg, in the past 6 months, but resumed last thursday when he was not feeling well.  Patient denies headache, vision change, neck pain, chest pain, abdominal pain, back pain, dizziness, lightheadedness, syncope, weakness or numbness.  No recent sick contacts, vaccinated for COVID    Labs :WBC 28, Lactate 1.5, Alk phos 137, AST 73, ALT 98      #Community Acquired Pneumonia  #Leukocytosis   Sepsis present on admission  Saturating 96% on RA  CXR : Patchy left upper and lower lung zone airspace opacities, compatible with pneumonia in the appropriate clinical setting. Follow-up to resolution is recommended.  s/p NS, rocephin, azithromycin and hydrocortisone 100mg IVP in the ED  C/w rocephin and azithromycin   UA negative  FU BCx     #Possible COPD exacerbation  - Patient noting some productive cough with wheezing  - Will continue with 50 mg prednisone for 5 days (patient on 10 mg daily)    #Hx of hypopituitarism (IGG4 related) and subsequent hormone insufficiency   On synthroid 50mcg qD  Prednisone 10 qD- Endorses compliance, S/P 100mg IVP hydrocortisone in the ED, increasing to 50 mg for suspected COPD exacerbation   Testosterone gel qD  Desmopressin 0.01 nasal spray TID PRN   Electrolytes wnl- Monitor qD  f/u AM cortisol  TSH 0.50     #Mild Transaminitis   RUQ US normal   ggt  avoid hepatotoxic meds   Trend LFTs     #Benign lung lesions  cont f/u at [facility]    #DVT PPX : Heparin subq  #Diet: Regular  #GI PPX: Not indicated   #Activity: IAT   47-year-old male PMH Hypopituitarism (IGG4 related per wife) diagnosed 6 years ago, sec adrenal insufficiency (Multiple episodes of adrenal crisis), hypothyroidism,  presents to the ED with multiple medical complaints. Pt complains of generalized fatigue, cough with productive sputum and difficulty breathing.  Patient states that he is concerned that he may be having an adrenal crisis given his symptoms. Also reports fever for the past 3 days T-max 101, has not taken any medication prior to coming into the ER.    Endorses non -compliance with Synthroid 50 mcg, in the past 6 months, but resumed last thursday when he was not feeling well.  Patient denies headache, vision change, neck pain, chest pain, abdominal pain, back pain, dizziness, lightheadedness, syncope, weakness or numbness.  No recent sick contacts, vaccinated for COVID    Labs :WBC 28, Lactate 1.5, Alk phos 137, AST 73, ALT 98      #Community Acquired Pneumonia  #Leukocytosis   Sepsis present on admission  Saturating 96% on RA  CXR : Patchy left upper and lower lung zone airspace opacities, compatible with pneumonia in the appropriate clinical setting. Follow-up to resolution is recommended.  s/p NS, rocephin, azithromycin and hydrocortisone 100mg IVP in the ED  C/w rocephin and azithromycin   UA negative  FU BCx     #Possible COPD exacerbation  - Patient noting some productive cough with wheezing  - Will continue with 50 mg prednisone for 5 days (patient on 10 mg daily)  - C/w duonebs     #Hx of hypopituitarism (IGG4 related) and subsequent hormone insufficiency   On synthroid 50mcg qD  Prednisone 10 qD- Endorses compliance, S/P 100mg IVP hydrocortisone in the ED, increasing to 50 mg for suspected COPD exacerbation   Testosterone gel qD  Desmopressin 0.01 nasal spray TID PRN   Electrolytes wnl- Monitor qD  f/u AM cortisol  TSH 0.50     #Mild Transaminitis   RUQ US normal   ggt  avoid hepatotoxic meds   Trend LFTs     #Benign lung lesions  cont f/u at [facility]    #DVT PPX : Heparin subq  #Diet: Regular  #GI PPX: Not indicated   #Activity: IAT

## 2022-06-14 NOTE — PROGRESS NOTE ADULT - ATTENDING COMMENTS
***My note supersedes any discrepancies that may be above in the resident's note***    47-year-old male PMH Hypopituitarism, sec adrenal insufficiency, hypothyroidism, presents with fevers, generalized fatigue, cough with productive sputum and difficulty breathing.  Patient states that he is concerned that he may be having an adrenal crisis given his symptoms. A    #Community Acquired Pneumonia  #Leukocytosis   - Sepsis present on admission: T 100.5F, , WBC 28k, suspect pulm source  - currently HDS, afebrile and satting well on RA  - WBC 28-->26k  - CXR : Patchy left upper and lower lung zone airspace opacities, compatible with pneumonia in the appropriate clinical setting. Follow-up to resolution is recommended.  - s/p NS, rocephin, azithromycin and hydrocortisone 100mg IVP in the ED  - C/w ceftriaxone 1g q24hr (day 2 of 7) and azithromycin 500mg IV qd (day 2 of 3)  - UA negative  - FU BCx   - procal pending    #Possible COPD exacerbation  - Patient noting some productive cough with wheezing  - Will continue with 50 mg prednisone for 5 days (patient on 10 mg daily)    #Hx of hypopituitarism (IGG4 related) and subsequent hormone insufficiency   - On synthroid 50mcg qD  - Prednisone 10 qD- Endorses compliance, S/P 100mg IVP hydrocortisone in the ED, increasing to 50 mg for suspected COPD exacerbation   - Testosterone gel qD  - Desmopressin 0.01 nasal spray TID PRN   - Electrolytes wnl- Monitor qD  - f/u AM cortisol  - TSH 0.50     #Mild Transaminitis   - RUQ US normal   - ggt  - avoid hepatotoxic meds   - Trend LFTs     #Benign lung lesions  - cont f/u at [facility]    #DVT PPX : Heparin subq  #Diet: Regular  #GI PPX: Not indicated   #Activity: IAT    #Progress Note Handoff  Pending (specify):  clinical improvement  Family discussion: updated patient at bedside. in agreement of plan. all questions answered  Disposition: anticipate for discharge to home, tomorrow 6/15

## 2022-06-15 ENCOUNTER — TRANSCRIPTION ENCOUNTER (OUTPATIENT)
Age: 47
End: 2022-06-15

## 2022-06-15 VITALS
TEMPERATURE: 97 F | HEART RATE: 55 BPM | DIASTOLIC BLOOD PRESSURE: 97 MMHG | RESPIRATION RATE: 20 BRPM | SYSTOLIC BLOOD PRESSURE: 160 MMHG

## 2022-06-15 LAB
ALBUMIN SERPL ELPH-MCNC: 2.9 G/DL — LOW (ref 3.5–5.2)
ALP SERPL-CCNC: 127 U/L — HIGH (ref 30–115)
ALT FLD-CCNC: 267 U/L — HIGH (ref 0–41)
ANION GAP SERPL CALC-SCNC: 14 MMOL/L — SIGNIFICANT CHANGE UP (ref 7–14)
AST SERPL-CCNC: 174 U/L — HIGH (ref 0–41)
BASOPHILS # BLD AUTO: 0.03 K/UL — SIGNIFICANT CHANGE UP (ref 0–0.2)
BASOPHILS NFR BLD AUTO: 0.1 % — SIGNIFICANT CHANGE UP (ref 0–1)
BILIRUB SERPL-MCNC: 0.3 MG/DL — SIGNIFICANT CHANGE UP (ref 0.2–1.2)
BUN SERPL-MCNC: 17 MG/DL — SIGNIFICANT CHANGE UP (ref 10–20)
CALCIUM SERPL-MCNC: 8.2 MG/DL — LOW (ref 8.5–10.1)
CHLORIDE SERPL-SCNC: 102 MMOL/L — SIGNIFICANT CHANGE UP (ref 98–110)
CO2 SERPL-SCNC: 23 MMOL/L — SIGNIFICANT CHANGE UP (ref 17–32)
CORTIS AM PEAK SERPL-MCNC: 7.7 UG/DL — SIGNIFICANT CHANGE UP (ref 6–18.4)
CREAT SERPL-MCNC: 0.7 MG/DL — SIGNIFICANT CHANGE UP (ref 0.7–1.5)
CULTURE RESULTS: NO GROWTH — SIGNIFICANT CHANGE UP
EGFR: 114 ML/MIN/1.73M2 — SIGNIFICANT CHANGE UP
EOSINOPHIL # BLD AUTO: 0.03 K/UL — SIGNIFICANT CHANGE UP (ref 0–0.7)
EOSINOPHIL NFR BLD AUTO: 0.1 % — SIGNIFICANT CHANGE UP (ref 0–8)
GLUCOSE SERPL-MCNC: 106 MG/DL — HIGH (ref 70–99)
HCT VFR BLD CALC: 32.4 % — LOW (ref 42–52)
HGB BLD-MCNC: 11.2 G/DL — LOW (ref 14–18)
IMM GRANULOCYTES NFR BLD AUTO: 9.3 % — HIGH (ref 0.1–0.3)
LYMPHOCYTES # BLD AUTO: 18.4 % — LOW (ref 20.5–51.1)
LYMPHOCYTES # BLD AUTO: 4.51 K/UL — HIGH (ref 1.2–3.4)
MAGNESIUM SERPL-MCNC: 1.9 MG/DL — SIGNIFICANT CHANGE UP (ref 1.8–2.4)
MCHC RBC-ENTMCNC: 31.7 PG — HIGH (ref 27–31)
MCHC RBC-ENTMCNC: 34.6 G/DL — SIGNIFICANT CHANGE UP (ref 32–37)
MCV RBC AUTO: 91.8 FL — SIGNIFICANT CHANGE UP (ref 80–94)
MONOCYTES # BLD AUTO: 1.36 K/UL — HIGH (ref 0.1–0.6)
MONOCYTES NFR BLD AUTO: 5.6 % — SIGNIFICANT CHANGE UP (ref 1.7–9.3)
NEUTROPHILS # BLD AUTO: 16.26 K/UL — HIGH (ref 1.4–6.5)
NEUTROPHILS NFR BLD AUTO: 66.5 % — SIGNIFICANT CHANGE UP (ref 42.2–75.2)
NRBC # BLD: 0 /100 WBCS — SIGNIFICANT CHANGE UP (ref 0–0)
PLATELET # BLD AUTO: 319 K/UL — SIGNIFICANT CHANGE UP (ref 130–400)
POTASSIUM SERPL-MCNC: 3.6 MMOL/L — SIGNIFICANT CHANGE UP (ref 3.5–5)
POTASSIUM SERPL-SCNC: 3.6 MMOL/L — SIGNIFICANT CHANGE UP (ref 3.5–5)
PROT SERPL-MCNC: 6.1 G/DL — SIGNIFICANT CHANGE UP (ref 6–8)
RBC # BLD: 3.53 M/UL — LOW (ref 4.7–6.1)
RBC # FLD: 13.4 % — SIGNIFICANT CHANGE UP (ref 11.5–14.5)
SODIUM SERPL-SCNC: 139 MMOL/L — SIGNIFICANT CHANGE UP (ref 135–146)
SPECIMEN SOURCE: SIGNIFICANT CHANGE UP
WBC # BLD: 24.47 K/UL — HIGH (ref 4.8–10.8)
WBC # FLD AUTO: 24.47 K/UL — HIGH (ref 4.8–10.8)

## 2022-06-15 PROCEDURE — 99239 HOSP IP/OBS DSCHRG MGMT >30: CPT

## 2022-06-15 RX ORDER — ALBUTEROL 90 UG/1
2 AEROSOL, METERED ORAL
Qty: 1 | Refills: 0
Start: 2022-06-15 | End: 2022-07-14

## 2022-06-15 RX ORDER — LEVOTHYROXINE SODIUM 125 MCG
1 TABLET ORAL
Qty: 30 | Refills: 0
Start: 2022-06-15 | End: 2022-07-14

## 2022-06-15 RX ORDER — LEVOTHYROXINE SODIUM 125 MCG
1 TABLET ORAL
Qty: 0 | Refills: 0 | DISCHARGE

## 2022-06-15 RX ORDER — CIPROFLOXACIN LACTATE 400MG/40ML
1 VIAL (ML) INTRAVENOUS
Qty: 4 | Refills: 0
Start: 2022-06-15 | End: 2022-06-18

## 2022-06-15 RX ADMIN — Medication 50 MILLIGRAM(S): at 05:06

## 2022-06-15 RX ADMIN — AZITHROMYCIN 255 MILLIGRAM(S): 500 TABLET, FILM COATED ORAL at 01:10

## 2022-06-15 RX ADMIN — CEFTRIAXONE 100 MILLIGRAM(S): 500 INJECTION, POWDER, FOR SOLUTION INTRAMUSCULAR; INTRAVENOUS at 10:00

## 2022-06-15 RX ADMIN — Medication 50 MICROGRAM(S): at 05:06

## 2022-06-15 RX ADMIN — Medication 3 MILLILITER(S): at 07:47

## 2022-06-15 NOTE — DISCHARGE NOTE PROVIDER - NSDCFUADDINST_GEN_ALL_CORE_FT
1. Please follow up with your primary care provider, Dr. Méndez upon discharge.     2. If you notice any worsening of symptoms, any fever/chills, increased wheezing or shortness of breath, cough, or weakness, please seek medical care.

## 2022-06-15 NOTE — DISCHARGE NOTE PROVIDER - CARE PROVIDER_API CALL
MIKEY SALAZAR  Family Medicine  [address] [place] [id number]  Phone: [phone]  Fax: ()-  Follow Up Time: 1 week

## 2022-06-15 NOTE — DISCHARGE NOTE NURSING/CASE MANAGEMENT/SOCIAL WORK - PATIENT PORTAL LINK FT
You can access the FollowMyHealth Patient Portal offered by [facility] by registering at the following website: [email or web address]. By joining [employer]’s FollowMyHealth portal, you will also be able to view your health information using other applications (apps) compatible with our system.

## 2022-06-15 NOTE — DISCHARGE NOTE NURSING/CASE MANAGEMENT/SOCIAL WORK - NSDCPEFALRISK_GEN_ALL_CORE
For information on Fall & Injury Prevention, visit: [email or web address] OR  [email or web address] OR  https://www.cdc.gov/steadi/patient.html

## 2022-06-15 NOTE — DISCHARGE NOTE PROVIDER - ATTENDING DISCHARGE PHYSICAL EXAMINATION:
General: NAD, pleasant    HEENT: Normocephalic, nontraumatic.   LUNGS: mild inspiratory wheeze noted   HEART: RRR. No murmurs, rubs, or gallops.  ABDOMEN: Nontender, nondistended. + bowel sounds.  EXT: Nonedematous, warm well perfused

## 2022-06-15 NOTE — DISCHARGE NOTE PROVIDER - NSDCCPCAREPLAN_GEN_ALL_CORE_FT
PRINCIPAL DISCHARGE DIAGNOSIS  Diagnosis: Sepsis  Assessment and Plan of Treatment: #Community Acquired Pneumonia  #Leukocytosis   #Possible COPD exacerbation  You came in with weakness, shortness of breath and wheezing. Chest x-ray showed findigns concerning for pneumonia. You were started on course of antibiotics and showed clinical improvement. Due to concern for possible COPD, your prednisone was increased to 50 mg and you were given nebulizer treatment. You can complete your antibiotic course upon discharge.   #Hx of hypopituitarism (IGG4 related) and subsequent hormone insufficiency   You continued on your synthroid, and your prednisone was increased.   for suspected COPD exacerbation. Your symptoms improved and you can continue on your home medications.      SECONDARY DISCHARGE DIAGNOSES  Diagnosis: Acute pneumonia  Assessment and Plan of Treatment:      PRINCIPAL DISCHARGE DIAGNOSIS  Diagnosis: Sepsis  Assessment and Plan of Treatment: You came in with weakness, shortness of breath and wheezing. Chest x-ray showed findigns concerning for community acquired pneumonia. You were started on course of antibiotics and showed clinical improvement. Please continue antibiotics as prescribed.

## 2022-06-15 NOTE — DISCHARGE NOTE PROVIDER - HOSPITAL COURSE
47-year-old male PMH Hypopituitarism (IGG4 related per wife) diagnosed 6 years ago, sec adrenal insufficiency (Multiple episodes of adrenal crisis), hypothyroidism,  presents to the ED with multiple medical complaints. Pt complains of generalized fatigue, cough with productive sputum and difficulty breathing.  Patient states that he is concerned that he may be having an adrenal crisis given his symptoms. Also reports fever for the past 3 days T-max 101, has not taken any medication prior to coming into the ER.    Endorses non -compliance with Synthroid 50 mcg, in the past 6 months, but resumed last thursday when he was not feeling well.  Patient denies headache, vision change, neck pain, chest pain, abdominal pain, back pain, dizziness, lightheadedness, syncope, weakness or numbness.  No recent sick contacts, vaccinated for COVID    Labs :WBC 28, Lactate 1.5, Alk phos 137, AST 73, ALT 98  CXR : Patchy left upper and lower lung zone airspace opacities, compatible with pneumonia in the appropriate clinical setting. Follow-up to resolution is recommended.  s/p NS, rocephin, azithromycin and hydrocortisone 100mg IVP in the ED.    Patient continued on rocephin and azithromycin for CAP in the GEGE and LLL and showed clinical improvement. As patient was noting some wheezing and productive cough, with concern for possible COPD exacerbation, patient was given 50 mg prednisone for two days and duoneb treatment. Patient stable for discharge with completion of antibiotics.       #Community Acquired Pneumonia  #Leukocytosis   Sepsis present on admission  Saturating 96% on RA  CXR : Patchy left upper and lower lung zone airspace opacities, compatible with pneumonia in the appropriate clinical setting. Follow-up to resolution is recommended.  s/p NS, rocephin, azithromycin and hydrocortisone 100mg IVP in the ED  C/w rocephin and azithromycin   UA negative  FU BCx     #Possible COPD exacerbation  - Patient noting some productive cough with wheezing  - S/p two days of 50 mg prednisone   - S/p duonebs     #Hx of hypopituitarism (IGG4 related) and subsequent hormone insufficiency   On synthroid 50mcg qD  Prednisone 10 qD- Endorses compliance, S/P 100mg IVP hydrocortisone in the ED, increasing to 50 mg for suspected COPD exacerbation   Testosterone gel qD  Desmopressin 0.01 nasal spray TID PRN   Electrolytes wnl- Monitor qD  f/u AM cortisol  TSH 0.50     #Mild Transaminitis   RUQ US normal   ggt  avoid hepatotoxic meds   Trend LFTs     #Benign lung lesions  cont f/u at [facility] 47-year-old male PMH Hypopituitarism (IGG4 related per wife) diagnosed 6 years ago, sec adrenal insufficiency (Multiple episodes of adrenal crisis), hypothyroidism,  presents to the ED with multiple medical complaints. Pt complains of generalized fatigue, cough with productive sputum and difficulty breathing.  Patient states that he is concerned that he may be having an adrenal crisis given his symptoms. Also reports fever for the past 3 days T-max 101, has not taken any medication prior to coming into the ER.    Endorses non -compliance with Synthroid 50 mcg, in the past 6 months, but resumed last thursday when he was not feeling well.  Patient denies headache, vision change, neck pain, chest pain, abdominal pain, back pain, dizziness, lightheadedness, syncope, weakness or numbness.  No recent sick contacts, vaccinated for COVID    Labs :WBC 28, Lactate 1.5, Alk phos 137, AST 73, ALT 98  CXR : Patchy left upper and lower lung zone airspace opacities, compatible with pneumonia in the appropriate clinical setting. Follow-up to resolution is recommended.  s/p NS, rocephin, azithromycin and hydrocortisone 100mg IVP in the ED.    Patient continued on rocephin and azithromycin for CAP in the GEGE and LLL and showed clinical improvement. As patient was noting some wheezing and productive cough, with concern for possible COPD exacerbation, patient was given 50 mg prednisone for two days and duoneb treatment. Patient stable for discharge with completion of antibiotics.       #Community Acquired Pneumonia  #Leukocytosis   Sepsis present on admission  Saturating 96% on RA  CXR : Patchy left upper and lower lung zone airspace opacities, compatible with pneumonia in the appropriate clinical setting. Follow-up to resolution is recommended.  s/p NS, rocephin, azithromycin and hydrocortisone 100mg IVP in the ED      #COPD exacerbation ruled out, wheezing on admission but no other indication that pt has copd. Given mild wheezing maybe related to bronchospasm etc will dc with albuterol inhaler for comfort as per pt request    #Hx of hypopituitarism (IGG4 related) and subsequent hormone insufficiency   On synthroid 50mcg qD  Prednisone 10 qD on dc ( home dose )   Testosterone gel qD  Desmopressin 0.01 nasal spray TID PRN     #Mild Transaminitis   RUQ US normal     #Benign lung lesions  cont f/u at [facility]

## 2022-06-18 LAB
CULTURE RESULTS: SIGNIFICANT CHANGE UP
CULTURE RESULTS: SIGNIFICANT CHANGE UP
SPECIMEN SOURCE: SIGNIFICANT CHANGE UP
SPECIMEN SOURCE: SIGNIFICANT CHANGE UP

## 2022-06-20 NOTE — PATIENT PROFILE ADULT - MEDICATIONS/VISITS
Patient did not show for their appointment.      I left a voice message for them to call us back to reschedule.    [facility]  Phone [phone] fax [phone]         no

## 2022-06-21 DIAGNOSIS — Z87.891 PERSONAL HISTORY OF NICOTINE DEPENDENCE: ICD-10-CM

## 2022-06-21 DIAGNOSIS — R91.8 OTHER NONSPECIFIC ABNORMAL FINDING OF LUNG FIELD: ICD-10-CM

## 2022-06-21 DIAGNOSIS — E03.9 HYPOTHYROIDISM, UNSPECIFIED: ICD-10-CM

## 2022-06-21 DIAGNOSIS — J98.01 ACUTE BRONCHOSPASM: ICD-10-CM

## 2022-06-21 DIAGNOSIS — J98.4 OTHER DISORDERS OF LUNG: ICD-10-CM

## 2022-06-21 DIAGNOSIS — J18.9 PNEUMONIA, UNSPECIFIED ORGANISM: ICD-10-CM

## 2022-06-21 DIAGNOSIS — Z91.14 PATIENT'S OTHER NONCOMPLIANCE WITH MEDICATION REGIMEN: ICD-10-CM

## 2022-06-21 DIAGNOSIS — E27.49 OTHER ADRENOCORTICAL INSUFFICIENCY: ICD-10-CM

## 2022-06-21 DIAGNOSIS — E23.0 HYPOPITUITARISM: ICD-10-CM

## 2022-06-21 DIAGNOSIS — R05.9 COUGH, UNSPECIFIED: ICD-10-CM

## 2022-06-21 DIAGNOSIS — A41.9 SEPSIS, UNSPECIFIED ORGANISM: ICD-10-CM

## 2023-01-11 ENCOUNTER — NON-APPOINTMENT (OUTPATIENT)
Age: 48
End: 2023-01-11

## 2023-01-11 ENCOUNTER — APPOINTMENT (OUTPATIENT)
Dept: ORTHOPEDIC SURGERY | Facility: CLINIC | Age: 48
End: 2023-01-11
Payer: OTHER MISCELLANEOUS

## 2023-01-11 VITALS — HEIGHT: 74 IN | BODY MASS INDEX: 23.1 KG/M2 | WEIGHT: 180 LBS

## 2023-01-11 PROBLEM — Z00.00 ENCOUNTER FOR PREVENTIVE HEALTH EXAMINATION: Status: ACTIVE | Noted: 2023-01-11

## 2023-01-11 PROBLEM — E23.0 HYPOPITUITARISM: Chronic | Status: ACTIVE | Noted: 2022-06-13

## 2023-01-11 PROBLEM — E27.40 UNSPECIFIED ADRENOCORTICAL INSUFFICIENCY: Chronic | Status: ACTIVE | Noted: 2022-06-13

## 2023-01-11 PROBLEM — E03.9 HYPOTHYROIDISM, UNSPECIFIED: Chronic | Status: ACTIVE | Noted: 2022-06-13

## 2023-01-11 PROCEDURE — 73562 X-RAY EXAM OF KNEE 3: CPT | Mod: RT

## 2023-01-11 PROCEDURE — 99203 OFFICE O/P NEW LOW 30 MIN: CPT | Mod: ACP

## 2023-01-11 PROCEDURE — 99072 ADDL SUPL MATRL&STAF TM PHE: CPT

## 2023-01-11 PROCEDURE — 73590 X-RAY EXAM OF LOWER LEG: CPT | Mod: LT

## 2023-01-11 NOTE — HISTORY OF PRESENT ILLNESS
[de-identified] : 47-year-old male comes in today for evaluation his left shin and right knee pain and injury that occurred on January 11, 2023. Patient had a slip and fall off of a ladder into an elevator pit approximately 5 ft.  Injuring the right knee and left shin.  No recent x-ray. patient states he has had prior surgery with Dr. Clements on this right knee.

## 2023-01-11 NOTE — ASSESSMENT
[FreeTextEntry1] :   Recommending rest, ice anti-inflammatory.  He is using a knee sleeve on the right knee which is fine.  He has total temporary disability, he plans on returning back to work next week on Tuesday January 17th.  He will follow up with Dr. Clements after MRI is completed.\par \par This patient was seen under the supervision of Dr. Clements.\par

## 2023-01-11 NOTE — IMAGING
[de-identified] :   Examination of the right knee knee effusion, no ecchymosis, no erythema.  Skin is intact.  Is able to straight leg raise.  Restriction and pain noted through terminal knee flexion and extension.  Tenderness along the lateral joint line nontender medially.  No tenderness over the patella, patella tendon or quadriceps tendon.  Calf is soft.  Positive Hosea's right knee.\par Examination of left knee no swelling, no knee effusion no ecchymosis no erythema.  Good range of motion to knee flexion and extension.  He has bruising anterior medial tibia.  Full range of motion of the ankle\par \par X-ray right knee no fracture or dislocation\par X-ray left tib-fib no fracture or dislocation

## 2023-01-28 ENCOUNTER — APPOINTMENT (OUTPATIENT)
Dept: ORTHOPEDIC SURGERY | Facility: CLINIC | Age: 48
End: 2023-01-28
Payer: OTHER MISCELLANEOUS

## 2023-01-28 PROCEDURE — 99213 OFFICE O/P EST LOW 20 MIN: CPT

## 2023-01-28 PROCEDURE — 99072 ADDL SUPL MATRL&STAF TM PHE: CPT

## 2023-01-29 NOTE — REASON FOR VISIT
[FreeTextEntry2] : patient is coming in for left tib fib and also right knee.   only feeling a little better difficulty squatting using a knee sleeve did return to work still awaiting an MRI of the knee previous injury 01/06/2021

## 2023-01-29 NOTE — ASSESSMENT
[FreeTextEntry1] :   Recommending rest, ice anti-inflammatory.  He is using a knee sleeve on the right knee which is fine.  he did return to work   put in another request for an MRI of the right knee\par he has a mild/moderate partial disability

## 2023-01-29 NOTE — IMAGING
[de-identified] :   Examination of the right knee knee effusion, no ecchymosis, no erythema.  Skin is intact.  Is able to straight leg raise.  Restriction and pain noted through terminal knee flexion and extension.  Tenderness along the lateral joint line nontender medially.  No tenderness over the patella, patella tendon or quadriceps tendon.  Calf is soft.  Positive Hosea's right knee.\par Examination of left knee no swelling, no knee effusion no ecchymosis no erythema.  Good range of motion to knee flexion and extension.  He has bruising anterior medial tibia.  Full range of motion of the ankle\par \par X-ray right knee no fracture or dislocation\par X-ray left tib-fib no fracture or dislocation\par \par  MRI right knee 02/23/2022:  Oblique medial meniscus tear mild chondral change

## 2023-01-29 NOTE — HISTORY OF PRESENT ILLNESS
[de-identified] : 47-year-old male comes in today for evaluation his left shin and right knee pain and injury that occurred on January 11, 2023. Patient had a slip and fall off of a ladder into an elevator pit approximately 5 ft.  Injuring the right knee and left shin.  No recent x-ray. patient states he has had prior surgery with Dr. Clements on this right knee.

## 2023-02-08 ENCOUNTER — FORM ENCOUNTER (OUTPATIENT)
Age: 48
End: 2023-02-08

## 2023-03-25 ENCOUNTER — APPOINTMENT (OUTPATIENT)
Dept: ORTHOPEDIC SURGERY | Facility: CLINIC | Age: 48
End: 2023-03-25
Payer: OTHER MISCELLANEOUS

## 2023-03-25 PROCEDURE — 99213 OFFICE O/P EST LOW 20 MIN: CPT

## 2023-03-25 NOTE — HISTORY OF PRESENT ILLNESS
[de-identified] : 47-year-old male comes in today for evaluation his left shin and right knee pain and injury that occurred on January 11, 2023. Patient had a slip and fall off of a ladder into an elevator pit approximately 5 ft.  Injuring the right knee and left shin.  No recent x-ray. patient states he has had prior surgery with Dr. Clements on this right knee.

## 2023-03-25 NOTE — REASON FOR VISIT
[Family Member] : family member [FreeTextEntry2] : Patient is coming in today for Work injury 01/10/2023 left tib fib and right knee. Still working pain in right knee more when he with screw partially squat some pain on the left shin

## 2023-03-25 NOTE — IMAGING
[de-identified] :   Examination of the right knee knee effusion, no ecchymosis, no erythema.  Skin is intact.  Is able to straight leg raise.  Restriction and pain noted through terminal knee flexion and extension.  Tenderness along the lateral joint line nontender medially.  No tenderness over the patella, patella tendon or quadriceps tendon.  Calf is soft.  Positive Hosea's right knee.\par Examination of left knee no swelling, no knee effusion no ecchymosis no erythema.  Good range of motion to knee flexion and extension.  He has bruising anterior medial tibia.  Full range of motion of the ankle\par \par X-ray right knee no fracture or dislocation\par X-ray left tib-fib no fracture or dislocation\par \par  MRI right knee 02/23/2022:  Oblique medial meniscus tear mild chondral change\par  MRI right knee 02/20/2023:  Acute injury lateral compartment chondral surface bruise ACL acute on chronic sprain no high-grade meniscal tear

## 2023-03-25 NOTE — ASSESSMENT
[FreeTextEntry1] :   Recommending rest, ice anti-inflammatory.  He is using a knee sleeve on the right knee which is fine.  he did return to work   put in another request for therapy for the right knee and leg\par he has a mild/moderate partial disability I will see him in a couple months we deferred any consideration for surgery to the right knee\par  if symptoms persist might need to consider arthroscopy we could consider cortisone injection next visit

## 2023-04-09 ENCOUNTER — FORM ENCOUNTER (OUTPATIENT)
Age: 48
End: 2023-04-09

## 2023-04-26 ENCOUNTER — OUTPATIENT (OUTPATIENT)
Dept: OUTPATIENT SERVICES | Facility: HOSPITAL | Age: 48
LOS: 1 days | End: 2023-04-26
Payer: COMMERCIAL

## 2023-04-26 ENCOUNTER — APPOINTMENT (OUTPATIENT)
Dept: ENDOCRINOLOGY | Facility: CLINIC | Age: 48
End: 2023-04-26
Payer: COMMERCIAL

## 2023-04-26 VITALS
SYSTOLIC BLOOD PRESSURE: 153 MMHG | DIASTOLIC BLOOD PRESSURE: 96 MMHG | WEIGHT: 180 LBS | BODY MASS INDEX: 23.11 KG/M2 | HEART RATE: 55 BPM

## 2023-04-26 DIAGNOSIS — E03.9 HYPOTHYROIDISM, UNSPECIFIED: ICD-10-CM

## 2023-04-26 DIAGNOSIS — E29.1 TESTICULAR HYPOFUNCTION: ICD-10-CM

## 2023-04-26 DIAGNOSIS — E23.2 DIABETES INSIPIDUS: ICD-10-CM

## 2023-04-26 DIAGNOSIS — D89.89 OTHER SPECIFIED DISORDERS INVOLVING THE IMMUNE MECHANISM, NOT ELSEWHERE CLASSIFIED: ICD-10-CM

## 2023-04-26 DIAGNOSIS — Z00.00 ENCOUNTER FOR GENERAL ADULT MEDICAL EXAMINATION WITHOUT ABNORMAL FINDINGS: ICD-10-CM

## 2023-04-26 DIAGNOSIS — E23.0 HYPOPITUITARISM: ICD-10-CM

## 2023-04-26 PROCEDURE — 99204 OFFICE O/P NEW MOD 45 MIN: CPT

## 2023-04-26 PROCEDURE — ZZZZZ: CPT

## 2023-04-26 RX ORDER — LEVOTHYROXINE SODIUM 0.05 MG/1
50 TABLET ORAL DAILY
Qty: 1 | Refills: 3 | Status: ACTIVE | COMMUNITY
Start: 1900-01-01 | End: 1900-01-01

## 2023-04-26 RX ORDER — DESMOPRESSIN ACETATE 0.1 MG/ML
0.01 SPRAY NASAL 3 TIMES DAILY
Qty: 3 | Refills: 6 | Status: ACTIVE | COMMUNITY
Start: 1900-01-01 | End: 1900-01-01

## 2023-04-26 RX ORDER — PREDNISONE 5 MG/1
5 TABLET ORAL
Qty: 1 | Refills: 3 | Status: ACTIVE | COMMUNITY
Start: 1900-01-01 | End: 1900-01-01

## 2023-04-26 NOTE — PHYSICAL EXAM
[Alert] : alert [Healthy Appearance] : healthy appearance [No Acute Distress] : no acute distress [Normal Sclera/Conjunctiva] : normal sclera/conjunctiva [EOMI] : extra ocular movement intact [Visual Fields Grossly Intact] : visual fields grossly intact [Supple] : the neck was supple [Thyroid Not Enlarged] : the thyroid was not enlarged [No Respiratory Distress] : no respiratory distress [No Accessory Muscle Use] : no accessory muscle use [Normal Rate and Effort] : normal respiratory rate and effort [Clear to Auscultation] : lungs were clear to auscultation bilaterally [Normal S1, S2] : normal S1 and S2 [No Murmurs] : no murmurs [Normal Rate] : heart rate was normal [Regular Rhythm] : with a regular rhythm [No Edema] : no peripheral edema [Pedal Pulses Normal] : the pedal pulses are present [Normal Gait] : normal gait [Normal Strength/Tone] : muscle strength and tone were normal [Oriented x3] : oriented to person, place, and time [Normal Affect] : the affect was normal [Normal Insight/Judgement] : insight and judgment were intact

## 2023-04-26 NOTE — ASSESSMENT
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\par - previously managed at [facility] and [facility]; will obtain prior records\par - Pituitary mass likely resulting in hypopituitarism (hypothyroidism, central DI, and secondary hypogonadism)\par - c/w prednisone 5mg QD\par - obtain routine labs (CBC, CMP, A1c, lipid panel)\par - obtain TSH, fT4, IGF-1, prolactin, testosterone (total and free)\par \par #Hypothyroidism\par - c/w levothyroxine 50mcg qd\par \par #Central DI\par - c/w desmopressin 0.01% 2 sprays intranasally TID (pt self-adjusts based on polyuria)\par \par #Secondary Hypogonadism\par - c/w testosterone gel 40.5mg topically QD\par \par RTC in 6 months or PRN

## 2023-04-26 NOTE — HISTORY OF PRESENT ILLNESS
1/18/22, 4:08 PM EST    DISCHARGE PLANNING EVALUATION    Met with Yanira Yang and two of his friends. Made him aware neither [facility] or [facility] are able to accept. His next two choices are [id number] and [facility]. Will make referrals tomorrow. [FreeTextEntry1] : 48M w/ h/o IGG4 disease (c/b pituitary mass) and hypopituitarism (leading to hypothyroidism, central DI, and secondary hypogonadism) presenting to establish care. Patient diagnosed w/ pituitary mass in 2014 after going into adrenal crisis. Per patient, further w/u demonstrated pituitary mass likely secondary to IGG4 disease. Subsequently treated w/ steroids alone (no surgery) w/ improvement in mass. Additionally, hypopituitarism managed via levothyroxine (for hypothyroidism), intranasal desmopressin (for central DI), and testosterone gel (for secondary hypogonadism). Previously followed w/ endocrinologist at [facility] and then at [facility], but now presenting to establish care here as previous endocrinologist left practice and patient lives closer to [facility] clinic. Symptoms overall well-managed on current regimen, but reports he hasn't seen endocrinologist in a few months.

## 2023-04-27 LAB
ALBUMIN SERPL ELPH-MCNC: 4.3 G/DL
ALP BLD-CCNC: 60 U/L
ALT SERPL-CCNC: 15 U/L
ANION GAP SERPL CALC-SCNC: 10 MMOL/L
AST SERPL-CCNC: 25 U/L
BASOPHILS # BLD AUTO: 0.15 K/UL
BASOPHILS NFR BLD AUTO: 1.6 %
BILIRUB SERPL-MCNC: 0.6 MG/DL
BUN SERPL-MCNC: 17 MG/DL
CALCIUM SERPL-MCNC: 9.5 MG/DL
CHLORIDE SERPL-SCNC: 105 MMOL/L
CHOLEST SERPL-MCNC: 196 MG/DL
CO2 SERPL-SCNC: 27 MMOL/L
CREAT SERPL-MCNC: 0.9 MG/DL
EGFR: 105 ML/MIN/1.73M2
EOSINOPHIL # BLD AUTO: 0.78 K/UL
EOSINOPHIL NFR BLD AUTO: 8.3 %
GLUCOSE SERPL-MCNC: 86 MG/DL
HCT VFR BLD CALC: 42.3 %
HDLC SERPL-MCNC: 63 MG/DL
HGB BLD-MCNC: 13.7 G/DL
IMM GRANULOCYTES NFR BLD AUTO: 0.6 %
LDLC SERPL CALC-MCNC: 113 MG/DL
LYMPHOCYTES # BLD AUTO: 4.33 K/UL
LYMPHOCYTES NFR BLD AUTO: 46.1 %
MAN DIFF?: NORMAL
MCHC RBC-ENTMCNC: 29.4 PG
MCHC RBC-ENTMCNC: 32.4 G/DL
MCV RBC AUTO: 90.8 FL
MONOCYTES # BLD AUTO: 0.68 K/UL
MONOCYTES NFR BLD AUTO: 7.2 %
NEUTROPHILS # BLD AUTO: 3.4 K/UL
NEUTROPHILS NFR BLD AUTO: 36.2 %
NONHDLC SERPL-MCNC: 133 MG/DL
PLATELET # BLD AUTO: 348 K/UL
POTASSIUM SERPL-SCNC: 3.9 MMOL/L
PROT SERPL-MCNC: 7.8 G/DL
RBC # BLD: 4.66 M/UL
RBC # FLD: 14 %
SODIUM SERPL-SCNC: 142 MMOL/L
T4 FREE SERPL-MCNC: 1.4 NG/DL
TRIGL SERPL-MCNC: 98 MG/DL
TSH SERPL-ACNC: 2.66 UIU/ML
WBC # FLD AUTO: 9.4 K/UL

## 2023-04-28 DIAGNOSIS — E23.0 HYPOPITUITARISM: ICD-10-CM

## 2023-04-28 DIAGNOSIS — E23.2 DIABETES INSIPIDUS: ICD-10-CM

## 2023-04-30 LAB
IGF-1 INTERP: NORMAL
IGF-I BLD-MCNC: 125 NG/ML
PROLACTIN SERPL-MCNC: 12.2 NG/ML
TESTOST SERPL-MCNC: 256 NG/DL

## 2023-05-02 ENCOUNTER — NON-APPOINTMENT (OUTPATIENT)
Age: 48
End: 2023-05-02

## 2023-06-07 NOTE — ED ADULT NURSE NOTE - NSFALLRSKHARMRISK_ED_ALL_ED
----- Message from Trisha Rodriguez PA-C sent at 6/7/2023 10:53 AM CDT -----  Please notify the patient HIV Antigen/Antibody is non reactive. Negative at this time. Will repeat in 4 months. Still awaiting Hepatitis C results.    no

## 2023-08-07 ENCOUNTER — APPOINTMENT (OUTPATIENT)
Dept: ORTHOPEDIC SURGERY | Facility: CLINIC | Age: 48
End: 2023-08-07
Payer: OTHER MISCELLANEOUS

## 2023-08-07 DIAGNOSIS — S83.8X1A SPRAIN OF OTHER SPECIFIED PARTS OF RIGHT KNEE, INITIAL ENCOUNTER: ICD-10-CM

## 2023-08-07 DIAGNOSIS — S80.12XA CONTUSION OF LEFT LOWER LEG, INITIAL ENCOUNTER: ICD-10-CM

## 2023-08-07 PROCEDURE — 99213 OFFICE O/P EST LOW 20 MIN: CPT

## 2023-08-07 NOTE — REASON FOR VISIT
[Family Member] : family member [FreeTextEntry2] : Patient is coming in for a follow up WC DOI 01/10/2023 left tib fib and right knee.  PT ended he is working feels more of a soreness in the knee not ready to consider any surgery

## 2023-08-07 NOTE — IMAGING
[de-identified] :   Examination of the right knee knee effusion, no ecchymosis, no erythema.  Skin is intact.  Is able to straight leg raise.  Restriction and pain noted through terminal knee flexion and extension.  Tenderness along the lateral joint line nontender medially.  No tenderness over the patella, patella tendon or quadriceps tendon.  Calf is soft.  Positive Hosea's right knee.\par  Examination of left knee no swelling, no knee effusion no ecchymosis no erythema.  Good range of motion to knee flexion and extension.  He has bruising anterior medial tibia.  Full range of motion of the ankle\par  \par  X-ray right knee no fracture or dislocation\par  X-ray left tib-fib no fracture or dislocation\par  \par   MRI right knee 02/23/2022:  Oblique medial meniscus tear mild chondral change\par   MRI right knee 02/20/2023:  Acute injury lateral compartment chondral surface bruise ACL acute on chronic sprain no high-grade meniscal tear

## 2023-08-07 NOTE — HISTORY OF PRESENT ILLNESS
[de-identified] : 47-year-old male comes in today for evaluation his left shin and right knee pain and injury that occurred on January 11, 2023. Patient had a slip and fall off of a ladder into an elevator pit approximately 5 ft.  Injuring the right knee and left shin.  No recent x-ray. patient states he has had prior surgery with Dr. Clements on this right knee.

## 2023-08-07 NOTE — ASSESSMENT
[FreeTextEntry1] :   Recommending continued conservative treatment  ice/heat anti-inflammatory.  He is using a knee sleeve on the right knee which is fine.  he is working he has a mild/moderate partial disability I will see him in a couple months we deferred any consideration for surgery to the right knee  if symptoms persist might need to consider arthroscopy we could still consider cortisone injection next visit

## 2023-09-05 ENCOUNTER — OUTPATIENT (OUTPATIENT)
Dept: OUTPATIENT SERVICES | Facility: HOSPITAL | Age: 48
LOS: 1 days | End: 2023-09-05
Payer: COMMERCIAL

## 2023-09-05 DIAGNOSIS — N40.1 BENIGN PROSTATIC HYPERPLASIA WITH LOWER URINARY TRACT SYMPTOMS: ICD-10-CM

## 2023-09-05 DIAGNOSIS — Z00.8 ENCOUNTER FOR OTHER GENERAL EXAMINATION: ICD-10-CM

## 2023-09-05 DIAGNOSIS — N13.8 OTHER OBSTRUCTIVE AND REFLUX UROPATHY: ICD-10-CM

## 2023-09-05 PROCEDURE — 76872 US TRANSRECTAL: CPT | Mod: 26

## 2023-09-05 PROCEDURE — 76856 US EXAM PELVIC COMPLETE: CPT

## 2023-09-05 PROCEDURE — 76872 US TRANSRECTAL: CPT

## 2023-09-05 PROCEDURE — 76856 US EXAM PELVIC COMPLETE: CPT | Mod: 26

## 2023-09-06 DIAGNOSIS — N13.8 OTHER OBSTRUCTIVE AND REFLUX UROPATHY: ICD-10-CM

## 2023-09-06 DIAGNOSIS — N40.1 BENIGN PROSTATIC HYPERPLASIA WITH LOWER URINARY TRACT SYMPTOMS: ICD-10-CM

## 2023-10-26 ENCOUNTER — APPOINTMENT (OUTPATIENT)
Dept: ENDOCRINOLOGY | Facility: CLINIC | Age: 48
End: 2023-10-26

## 2024-05-07 NOTE — DISCHARGE NOTE PROVIDER - NSDCMRMEDTOKEN_GEN_ALL_CORE_FT
desmopressin nasal 0.01% spray (obsolete): nasal 3 times a day, As Needed  predniSONE 10 mg oral tablet: 1 tab(s) orally once a day  Synthroid 50 mcg (0.05 mg) oral tablet: 1 tab(s) orally once a day  testosterone topical: Apply topically to affected area once a day   Albuterol (Eqv-Proventil HFA) 90 mcg/inh inhalation aerosol: 2 puff(s) inhaled every 6 hours   desmopressin nasal 0.01% spray (obsolete): nasal 3 times a day, As Needed  levoFLOXacin 750 mg oral tablet: 1 tab(s) orally once a day   Finish last dose on 6/19  testosterone topical: Apply topically to affected area once a day   Unknown